# Patient Record
(demographics unavailable — no encounter records)

---

## 2024-12-03 NOTE — CARDIOLOGY SUMMARY
[de-identified] : 12/3/24: AFib 77bpm [de-identified] : 6/30/22: mod LVH, normal LV systolic function, LA 38mL/m2, mild MR/AI [de-identified] : 8/25/22: ANURADHA

## 2024-12-03 NOTE — HISTORY OF PRESENT ILLNESS
[FreeTextEntry1] : Mr. Ricci Knox is a 66yo male with h/o HTN, HLD, obesity with BMI 31, JOSS on CPAP, depression and PTSD. He was incidentally found to have AFib by PMD in 2019 and underwent DCCV 8/25/22 with ERAF within 3 days. He has been treated with BB for rate control and on Xarelto for CHADS-VASc 2. He presents today for further evaluation and management of persistent AFib. He denies palpitations, dizziness, presyncope/syncope, SOB or chest pain. He does reports significant fatigue although he has attributed this to working nights and difficulty sleeping with CPAP.

## 2024-12-03 NOTE — DISCUSSION/SUMMARY
[FreeTextEntry1] : Mr. Knox is a 68yo male with AFib dx in 2019 in the setting of HTN, HLD, obesity and JOSS. He underwent DCCV 8/25/22 with ERAF within 3 days and since has been treated with BB for rate control and Xarelto for stroke prevention (CHADS-VASc 2). He presents today for further evaluation and management of persistent AFib. He reports significant fatigue but no other arrhythmia symptomatology.   We discussed the natural history of AFib and treatment strategies with focus on PFA to suppress his AFib. We discussed the procedure, post procedure care and expectations as well as risk/benefit profile. After all his questions were answered, Mr Johnson conveyed understanding and would like to proceed with ablation to treat his AFib.  -Schedule PFA at [facility] -Continue metoprolol 100mg BID -Continue antihypertensive regimen -Continue Xarelto 20mg QD without interruption through the ablation procedure -Hold metformin the morning of the procedure in the setting of NPO status

## 2024-12-03 NOTE — HISTORY OF PRESENT ILLNESS
[FreeTextEntry1] : Mr. Ricci Knox is a 68yo male with h/o HTN, HLD, obesity with BMI 31, JOSS on CPAP, depression and PTSD. He was incidentally found to have AFib by PMD in 2019 and underwent DCCV 8/25/22 with ERAF within 3 days. He has been treated with BB for rate control and on Xarelto for CHADS-VASc 2. He presents today for further evaluation and management of persistent AFib. He denies palpitations, dizziness, presyncope/syncope, SOB or chest pain. He does reports significant fatigue although he has attributed this to working nights and difficulty sleeping with CPAP.

## 2024-12-03 NOTE — END OF VISIT
[FreeTextEntry3] : I, Dr. Lopez, personally performed the evaluation and management (E/M) services for this new patient.  That E/M includes conducting the initial examination, assessing all conditions, and establishing the plan of care.  Today, my ACP was here to observe my evaluation and management services for this patient to be followed going forward.

## 2024-12-03 NOTE — CARDIOLOGY SUMMARY
[de-identified] : 12/3/24: AFib 77bpm [de-identified] : 6/30/22: mod LVH, normal LV systolic function, LA 38mL/m2, mild MR/AI [de-identified] : 8/25/22: ANURADHA

## 2024-12-03 NOTE — PHYSICAL EXAM
[Well Developed] : well developed [No Acute Distress] : no acute distress [Normal Venous Pressure] : normal venous pressure [Normal S1, S2] : normal S1, S2 [Clear Lung Fields] : clear lung fields [No Respiratory Distress] : no respiratory distress  [No Edema] : no edema [Moves all extremities] : moves all extremities [Alert and Oriented] : alert and oriented [Normal] : no rash, no skin lesions

## 2024-12-03 NOTE — DISCUSSION/SUMMARY
[FreeTextEntry1] : Mr. Knox is a 68yo male with AFib dx in 2019 in the setting of HTN, HLD, obesity and JOSS. He underwent DCCV 8/25/22 with ERAF within 3 days and since has been treated with BB for rate control and Xarelto for stroke prevention (CHADS-VASc 2). He presents today for further evaluation and management of persistent AFib. He reports significant fatigue but no other arrhythmia symptomatology.   We discussed the natural history of AFib and treatment strategies with focus on PFA to suppress his AFib. We discussed the procedure, post procedure care and expectations as well as risk/benefit profile. After all his questions were answered, Mr Johnson conveyed understanding and would like to proceed with ablation to treat his AFib.  -Schedule PFA at [facility] -Continue metoprolol 100mg BID -Continue antihypertensive regimen -Continue Xarelto 20mg QD without interruption through the ablation procedure -Hold metformin the morning of the procedure in the setting of NPO status

## 2024-12-03 NOTE — REVIEW OF SYSTEMS
[Feeling Fatigued] : feeling fatigued [SOB] : no shortness of breath [Dyspnea on exertion] : not dyspnea during exertion [Chest Discomfort] : no chest discomfort [Lower Ext Edema] : no extremity edema [Palpitations] : no palpitations [Orthopnea] : no orthopnea [PND] : no PND [Syncope] : no syncope [Dizziness] : no dizziness [Easy Bleeding] : no tendency for easy bleeding [Negative] : Psychiatric

## 2024-12-13 NOTE — REVIEW OF SYSTEMS
[Weight Loss (___ Lbs)] : [unfilled] ~Ulb weight loss [Anxiety] : anxiety [Negative] : Heme/Lymph [Under Stress] : under stress [Chest Discomfort] : no chest discomfort [Palpitations] : no palpitations

## 2024-12-13 NOTE — PHYSICAL EXAM
[No Murmur] : no murmur [Clear Lung Fields] : clear lung fields [Normal Gait] : normal gait [Alert and Oriented] : alert and oriented [No Acute Distress] : no acute distress [Good Air Entry] : good air entry [de-identified] :  irregularl

## 2024-12-13 NOTE — DISCUSSION/SUMMARY
[With Me] : with me [___ Month(s)] : in [unfilled] month(s) [FreeTextEntry1] : Hypertension: Controlled; continue metoprolol, amlodipine, and irbesartan.  Atrial fibrillation: Persistent; I agree with plans for ablation; in the interim, continue metoprolol and Xarelto.  Diabetes mellitus: Patient describes fair control; managed by Dr. Moran.  Hyperlipidemia: Stable; continue rosuvastatin.

## 2024-12-13 NOTE — HISTORY OF PRESENT ILLNESS
[FreeTextEntry1] : Ricci Knox is a 67-year-old man with a history of hypertension, hyperlipidemia, atrial fibrillation (cardioverted on 8/25/22 but had an early recurrence of arrhythmia), obstructive sleep apnea using CPAP, renal stones, and PTSD + depression, who returns for cardiac examination.  On December 3, he met with Dr. Lopez (electrophysiology) and ablation is planned.  He feels well; tolerating all prescribed pharmacotherapy.  He is active.  He denies angina, dyspnea; compliant with nocturnal CPAP.  He offers no new complaints.  * This visit was conducted as part of ongoing, longitudinal medical care for patient's chronic medical diagnoses and other issues.

## 2025-02-03 NOTE — HISTORY OF PRESENT ILLNESS
[TextBox_4] : 67-year-old male with sleep apnea.  He is using CPAP nightly with good results.  He is scheduled for cardiac ablation for atrial fibrillation.  He is active at work.

## 2025-02-03 NOTE — DISCUSSION/SUMMARY
[FreeTextEntry1] : Ricci has persistent atrial fibrillation requiring cardioversion for which she appears stable from my standpoint. His obstructive sleep apnea is stable with CPAP which she is using nightly with good results.  He had recent influenza.  He will start Zepbound soon.  Advised exercise and lose weight.  I will see him in 6 months.   Time spent reviewing file, taking history , and examining patient: __19________ minutes

## 2025-02-03 NOTE — CONSULT LETTER
[Dear  ___] : Dear  [unfilled], [Consult Letter:] : I had the pleasure of evaluating your patient, [unfilled]. [Please see my note below.] : Please see my note below. [Consult Closing:] : Thank you very much for allowing me to participate in the care of this patient.  If you have any questions, please do not hesitate to contact me. [Sincerely,] : Sincerely, [FreeTextEntry2] : Jose Moran [FreeTextEntry3] :  Trent Shetty MD FACP FCCP

## 2025-02-03 NOTE — PHYSICAL EXAM
[No Acute Distress] : no acute distress [Normal Oropharynx] : normal oropharynx [Normal Appearance] : normal appearance [No Neck Mass] : no neck mass [Normal S1, S2] : normal s1, s2 [Irregular rate/rhythm] : irregular rate/rhythm [No Murmurs] : no murmurs [No Resp Distress] : no resp distress [Clear to Auscultation Bilaterally] : clear to auscultation bilaterally [No Abnormalities] : no abnormalities [Benign] : benign [No Clubbing] : no clubbing [No Cyanosis] : no cyanosis [No Edema] : no edema [Normal Color/ Pigmentation] : normal color/ pigmentation [Normal Affect] : normal affect [TextBox_2] : overweight male

## 2025-05-12 NOTE — PHYSICAL EXAM
[Well Developed] : well developed [No Acute Distress] : no acute distress [Normal Venous Pressure] : normal venous pressure [Normal S1, S2] : normal S1, S2 [Clear Lung Fields] : clear lung fields [No Respiratory Distress] : no respiratory distress  [No Edema] : no edema [Normal] : no rash, no skin lesions [Moves all extremities] : moves all extremities [Alert and Oriented] : alert and oriented

## 2025-05-14 NOTE — CARDIOLOGY SUMMARY
[de-identified] : today: Sinus 12/3/24: AFib 77bpm [de-identified] : 4/16/2025 1. Left ventricular cavity is normal in size. Left ventricular systolic function is normal with an ejection fraction of 59 % by Diego's method of disks. There are no regional wall motion abnormalities seen.  2. Mild to moderate left ventricular hypertrophy.  3. Mildly enlarged right ventricular cavity size and normal right ventricular systolic function.  4. Left atrium is mildly dilated.  5. Trileaflet aortic valve with normal systolic excursion. There is calcification of the aortic valve leaflets.  6. Mild to moderate aortic regurgitation.  7. Trace mitral regurgitation.  8. Trace tricuspid regurgitation.  9. The inferior vena cava is normal in size measuring 1.90 cm in diameter, (normal <2.1cm) with normal inspiratory collapse (normal >50%) consistent with normal right atrial pressure (\R\3, range 0-5mmHg).  6/30/22: mod LVH, normal LV systolic function, LA 38mL/m2, mild MR/AI [de-identified] : 3/24/2025 Successful pulsed Ny ablation of the pulmonary veins and posterior wall  8/25/22: ANURADHA

## 2025-05-14 NOTE — HISTORY OF PRESENT ILLNESS
[FreeTextEntry1] : Mr. Knox is a 66yo male with AFib dx in 2019 in the setting of HTN, HLD, obesity and JOSS. He underwent DCCV 8/25/22 with ERAF within 3 days and since has been treated with BB for rate control and Xarelto for stroke prevention (CHADS-VASc 2). EPS/ablation  3/24/2025 (Successful pulsed Ny ablation of the pulmonary veins and posterior wall)  No chest pain. no shortness of breath. No palpitations. One week post procedure he was at [place] for "sepsis" presumable secondary to urinary retention. He remains with a mills catheter. He is still fatigued/recovering.

## 2025-05-14 NOTE — DISCUSSION/SUMMARY
[FreeTextEntry1] : Mr. Knox is a 66yo male with AFib dx in 2019 in the setting of HTN, HLD, obesity and JOSS. He underwent DCCV 8/25/22 with ERAF within 3 days and since has been treated with BB for rate control and Xarelto for stroke prevention (CHADS-VASc 2). EPS/ablation  3/24/2025 (Successful pulsed Ny ablation of the pulmonary veins and posterior wall)  Doing well post procedure albeit on amio. We will check TSH and LFTs today (c/o fatigue).  We will DC amio.   f/u in 2 months

## 2025-05-14 NOTE — HISTORY OF PRESENT ILLNESS
[FreeTextEntry1] : Mr. Knox is a 66yo male with AFib dx in 2019 in the setting of HTN, HLD, obesity and JOSS. He underwent DCCV 8/25/22 with ERAF within 3 days and since has been treated with BB for rate control and Xarelto for stroke prevention (CHADS-VASc 2). EPS/ablation  3/24/2025 (Successful pulsed Ny ablation of the pulmonary veins and posterior wall)  No chest pain. no shortness of breath. No palpitations. One week post procedure he was at [place] for "sepsis" presumable secondary to urinary retention. He remains with a mills catheter. He is still fatigued/recovering.

## 2025-05-14 NOTE — REVIEW OF SYSTEMS
[Feeling Fatigued] : feeling fatigued [Negative] : Psychiatric [SOB] : no shortness of breath [Dyspnea on exertion] : not dyspnea during exertion [Chest Discomfort] : no chest discomfort [Lower Ext Edema] : no extremity edema [Palpitations] : no palpitations [Orthopnea] : no orthopnea [PND] : no PND [Syncope] : no syncope [Dizziness] : no dizziness [Easy Bleeding] : no tendency for easy bleeding [FreeTextEntry8] : urinary catheter

## 2025-05-14 NOTE — CARDIOLOGY SUMMARY
[de-identified] : today: Sinus 12/3/24: AFib 77bpm [de-identified] : 4/16/2025 1. Left ventricular cavity is normal in size. Left ventricular systolic function is normal with an ejection fraction of 59 % by Diego's method of disks. There are no regional wall motion abnormalities seen.  2. Mild to moderate left ventricular hypertrophy.  3. Mildly enlarged right ventricular cavity size and normal right ventricular systolic function.  4. Left atrium is mildly dilated.  5. Trileaflet aortic valve with normal systolic excursion. There is calcification of the aortic valve leaflets.  6. Mild to moderate aortic regurgitation.  7. Trace mitral regurgitation.  8. Trace tricuspid regurgitation.  9. The inferior vena cava is normal in size measuring 1.90 cm in diameter, (normal <2.1cm) with normal inspiratory collapse (normal >50%) consistent with normal right atrial pressure (\R\3, range 0-5mmHg).  6/30/22: mod LVH, normal LV systolic function, LA 38mL/m2, mild MR/AI [de-identified] : 3/24/2025 Successful pulsed Ny ablation of the pulmonary veins and posterior wall  8/25/22: ANURADHA

## 2025-05-14 NOTE — CARDIOLOGY SUMMARY
[de-identified] : today: Sinus 12/3/24: AFib 77bpm [de-identified] : 4/16/2025 1. Left ventricular cavity is normal in size. Left ventricular systolic function is normal with an ejection fraction of 59 % by Digeo's method of disks. There are no regional wall motion abnormalities seen.  2. Mild to moderate left ventricular hypertrophy.  3. Mildly enlarged right ventricular cavity size and normal right ventricular systolic function.  4. Left atrium is mildly dilated.  5. Trileaflet aortic valve with normal systolic excursion. There is calcification of the aortic valve leaflets.  6. Mild to moderate aortic regurgitation.  7. Trace mitral regurgitation.  8. Trace tricuspid regurgitation.  9. The inferior vena cava is normal in size measuring 1.90 cm in diameter, (normal <2.1cm) with normal inspiratory collapse (normal >50%) consistent with normal right atrial pressure (\R\3, range 0-5mmHg).  6/30/22: mod LVH, normal LV systolic function, LA 38mL/m2, mild MR/AI [de-identified] : 3/24/2025 Successful pulsed Ny ablation of the pulmonary veins and posterior wall  8/25/22: ANURADHA

## 2025-05-14 NOTE — HISTORY OF PRESENT ILLNESS
[FreeTextEntry1] : Mr. Knox is a 66yo male with AFib dx in 2019 in the setting of HTN, HLD, obesity and JOSS. He underwent DCCV 8/25/22 with ERAF within 3 days and since has been treated with BB for rate control and Xarelto for stroke prevention (CHADS-VASc 2). EPS/ablation  3/24/2025 (Successful pulsed Ny ablation of the pulmonary veins and posterior wall)  No chest pain. no shortness of breath. No palpitations. One week post procedure he was at [place] for "sepsis" presumable secondary to urinary retention. He remains with a mills catheter. He is still fatigued/recovering.

## 2025-05-14 NOTE — DISCUSSION/SUMMARY
[FreeTextEntry1] : Mr. Knox is a 68yo male with AFib dx in 2019 in the setting of HTN, HLD, obesity and JOSS. He underwent DCCV 8/25/22 with ERAF within 3 days and since has been treated with BB for rate control and Xarelto for stroke prevention (CHADS-VASc 2). EPS/ablation  3/24/2025 (Successful pulsed Ny ablation of the pulmonary veins and posterior wall)  Doing well post procedure albeit on amio. We will check TSH and LFTs today (c/o fatigue).  We will DC amio.   f/u in 2 months

## 2025-05-29 NOTE — CARDIOLOGY SUMMARY
[de-identified] : 5/29/2025. Sinus rhythm [de-identified] : 4/16/2025 (PV).  Normal left ventricular size and function with ejection fraction 59%.  Mild to moderate LVH.  Mildly enlarged right ventricle with normal function.  Mild left atrial enlargement.  Aortic valve sclerosis.  Mild to moderate aortic regurgitation.  Trace mitral and tricuspid regurgitation.   [de-identified] : 3/24/2025.  Successful pulmonary vein and posterior wall isolation with PFA for treatment of persistent atrial fibrillation (Dr. Lopez).

## 2025-05-29 NOTE — REVIEW OF SYSTEMS
[Under Stress] : under stress [Weight Gain (___ Lbs)] : [unfilled] ~Ulb weight gain [SOB] : no shortness of breath [Chest Discomfort] : no chest discomfort [Palpitations] : no palpitations [FreeTextEntry8] : SEE HPI

## 2025-05-29 NOTE — DISCUSSION/SUMMARY
[With Me] : with me [___ Month(s)] : in [unfilled] month(s) [FreeTextEntry1] :  Hypertension: Controlled; continue metoprolol, amlodipine; Irbesartan was discontinued because of renal insufficiency--will continue to monitor blood pressure and titrate regimen as appropriate.  Atrial fibrillation: Maintaining sinus rhythm following recent ablation; continue metoprolol and Xarelto--I told him that Xarelto dose may need to be increased if renal function recovers.  Hyperlipidemia: Stable; continue rosuvastatin.

## 2025-05-29 NOTE — PHYSICAL EXAM
[No Acute Distress] : no acute distress [No Murmur] : no murmur [Clear Lung Fields] : clear lung fields [Normal Gait] : normal gait [Alert and Oriented] : alert and oriented [Normal S1, S2] : normal S1, S2 [No Edema] : no edema [de-identified] : Obese [de-identified] : Regular

## 2025-05-29 NOTE — HISTORY OF PRESENT ILLNESS
[FreeTextEntry1] : Ricci Knox is a 67-year-old man with a history of hypertension, hyperlipidemia, atrial fibrillation (cardioverted on 8/25/22 but had an early recurrence of arrhythmia), obstructive sleep apnea using CPAP, renal stones, and PTSD + depression, who returns for cardiac examination.  On March 24, 2025, he underwent an atrial fibrillation ablation with Dr. Lopez.  He was hospitalized in mid-April ([place]) with acute kidney injury (requiring emergent HD) attributed to obstructive uropathy.  Creatinine was 29 on day of admission (1.03 one-month earlier; improved to 1.60 on day of discharge).  Electrocardiograms during his hospital stay showed sinus rhythm.  He remains well from a cardiovascular standpoint; blood pressure has been adequately controlled on home monitoring and he has remained in sinus rhythm--he says he feels significantly "better" when in sinus rhythm compared to atrial fibrillation.  He has no new cardiac complaints.  He is seeing Dr. Ramires for nephrology care and Dr. Kelly (urology).  He describes persistent need for a Mcneil catheter.  * This visit was conducted as part of ongoing, longitudinal medical care for patient's chronic medical diagnoses and other issues.